# Patient Record
Sex: MALE | Race: WHITE | NOT HISPANIC OR LATINO
[De-identification: names, ages, dates, MRNs, and addresses within clinical notes are randomized per-mention and may not be internally consistent; named-entity substitution may affect disease eponyms.]

---

## 2018-04-17 ENCOUNTER — RX ONLY (RX ONLY)
Age: 73
End: 2018-04-17

## 2022-10-26 ENCOUNTER — RX ONLY (RX ONLY)
Age: 77
End: 2022-10-26

## 2022-11-22 ENCOUNTER — APPOINTMENT (OUTPATIENT)
Dept: URBAN - METROPOLITAN AREA CLINIC 194 | Age: 77
Setting detail: DERMATOLOGY
End: 2022-11-23

## 2022-11-22 VITALS
RESPIRATION RATE: 16 BRPM | SYSTOLIC BLOOD PRESSURE: 122 MMHG | WEIGHT: 190 LBS | DIASTOLIC BLOOD PRESSURE: 68 MMHG | TEMPERATURE: 98.4 F | HEART RATE: 76 BPM | HEIGHT: 71 IN

## 2022-11-22 VITALS
TEMPERATURE: 98.4 F | HEART RATE: 74 BPM | DIASTOLIC BLOOD PRESSURE: 68 MMHG | RESPIRATION RATE: 18 BRPM | SYSTOLIC BLOOD PRESSURE: 124 MMHG

## 2022-11-22 DIAGNOSIS — Z12.83 ENCOUNTER FOR SCREENING FOR MALIGNANT NEOPLASM OF SKIN: ICD-10-CM

## 2022-11-22 DIAGNOSIS — L57.8 OTHER SKIN CHANGES DUE TO CHRONIC EXPOSURE TO NONIONIZING RADIATION: ICD-10-CM

## 2022-11-22 PROBLEM — C44.311 BASAL CELL CARCINOMA OF SKIN OF NOSE: Status: ACTIVE | Noted: 2022-11-22

## 2022-11-22 PROBLEM — C44.619 BASAL CELL CARCINOMA OF SKIN OF LEFT UPPER LIMB, INCLUDING SHOULDER: Status: ACTIVE | Noted: 2022-11-22

## 2022-11-22 PROCEDURE — OTHER MOHS SURGERY: OTHER

## 2022-11-22 PROCEDURE — OTHER ASC CONSULTATION: OTHER

## 2022-11-22 PROCEDURE — OTHER COUNSELING: OTHER

## 2022-11-22 PROCEDURE — OTHER MIPS QUALITY: OTHER

## 2022-11-22 PROCEDURE — 17313 MOHS 1 STAGE T/A/L: CPT

## 2022-11-22 PROCEDURE — 13121 CMPLX RPR S/A/L 2.6-7.5 CM: CPT

## 2022-11-22 PROCEDURE — OTHER CONSULTATION FOR MOHS SURGERY: OTHER

## 2022-11-22 PROCEDURE — 99204 OFFICE O/P NEW MOD 45 MIN: CPT | Mod: 57

## 2022-11-22 PROCEDURE — OTHER SURGICAL DECISION MAKING: OTHER

## 2022-11-22 NOTE — PROCEDURE: MOHS SURGERY
Body Location Override (Optional - Billing Will Still Be Based On Selected Body Map Location If Applicable): left mid dorsal forearm

## 2022-11-22 NOTE — HPI: SKIN LESION (BASAL CELL CARCINOMA)
How Severe Is Your Skin Cancer?: mild
Is This A New Presentation, Or A Follow-Up?: New Basal Cell Carcinoma
When Was Basal Cell Biopsied? (Optional): 10/12/22

## 2022-11-22 NOTE — PROCEDURE: SURGICAL DECISION MAKING
Risk Assessment Explanation (Does Not Render In The Note): Clinical determination of the probability and/or consequences of an event, such as surgery. Clinical assessment of the level of risk is affected by the nature of the event under consideration for the patient. Modifier 57 is used to indicate an Evaluation and Management (E/M) service resulted in the initial decision to perform surgery either the day before a major surgery (90 day global) or the day of a major surgery.
Complexity (Necessary For Coding; Major - 90 Day Global With Some Exceptions; Minor - 10 Day Global): major
Initial Decision For Surgery?: Yes
Discussion: Decision for surgery refers to any surgeries performed today, or discussion of treatment in the future. In general, decision for repair is not made until the final extent of the surgical wound is known.
Date Of Surgery - Today Or Tomorrow?: today

## 2022-11-28 ENCOUNTER — APPOINTMENT (OUTPATIENT)
Dept: URBAN - METROPOLITAN AREA CLINIC 194 | Age: 77
Setting detail: DERMATOLOGY
End: 2022-11-29

## 2022-11-28 VITALS — RESPIRATION RATE: 16 BRPM | DIASTOLIC BLOOD PRESSURE: 76 MMHG | HEART RATE: 64 BPM | SYSTOLIC BLOOD PRESSURE: 146 MMHG

## 2022-11-28 VITALS
HEIGHT: 71.5 IN | HEART RATE: 60 BPM | WEIGHT: 190 LBS | DIASTOLIC BLOOD PRESSURE: 74 MMHG | SYSTOLIC BLOOD PRESSURE: 148 MMHG | RESPIRATION RATE: 16 BRPM | TEMPERATURE: 98.1 F

## 2022-11-28 DIAGNOSIS — Z12.83 ENCOUNTER FOR SCREENING FOR MALIGNANT NEOPLASM OF SKIN: ICD-10-CM

## 2022-11-28 PROBLEM — C44.311 BASAL CELL CARCINOMA OF SKIN OF NOSE: Status: ACTIVE | Noted: 2022-11-28

## 2022-11-28 PROBLEM — Z48.01 ENCOUNTER FOR CHANGE OR REMOVAL OF SURGICAL WOUND DRESSING: Status: ACTIVE | Noted: 2022-11-28

## 2022-11-28 PROCEDURE — 17311 MOHS 1 STAGE H/N/HF/G: CPT | Mod: 79

## 2022-11-28 PROCEDURE — OTHER DRESSING CHANGE: OTHER

## 2022-11-28 PROCEDURE — OTHER ASC CONSULTATION: OTHER

## 2022-11-28 PROCEDURE — 99212 OFFICE O/P EST SF 10 MIN: CPT | Mod: 25,24

## 2022-11-28 PROCEDURE — OTHER COUNSELING: OTHER

## 2022-11-28 PROCEDURE — OTHER MIPS QUALITY: OTHER

## 2022-11-28 PROCEDURE — 14061 TIS TRNFR E/N/E/L10.1-30SQCM: CPT | Mod: 79

## 2022-11-28 PROCEDURE — OTHER SURGICAL DECISION MAKING: OTHER

## 2022-11-28 PROCEDURE — OTHER MOHS SURGERY: OTHER

## 2022-11-28 NOTE — PROCEDURE: MOHS SURGERY
Body Location Override (Optional - Billing Will Still Be Based On Selected Body Map Location If Applicable): right lower nasal dorsum

## 2022-11-28 NOTE — PROCEDURE: DRESSING CHANGE
Add 54097 Cpt? (Important Note: In 2017 The Use Of 86350 Is Being Tracked By Cms To Determine Future Global Period Reimbursement For Global Periods): no
Detail Level: Detailed

## 2022-11-28 NOTE — PROCEDURE: MOHS SURGERY
136 Peng Advancement Flap Text: Because of the full-thickness nature of the defect and location adjacent to free margin of alar rims, and to maintain functional airway and alar rim position, a bilateral advancement flap was planned. After prep and anesthesia, incisions were extended from the opposite side of the wound along the alar grooves, and Burow’s triangles at the end of each incision were excised.  Two flaps were created by undermining in the subcutaneous plane skeletonizing the nasal cartilages. After hemostasis was obtained, the flaps were advanced and sutured in a layered fashion.

## 2022-11-28 NOTE — HPI: SKIN LESION (BASAL CELL CARCINOMA)
How Severe Is Your Skin Cancer?: moderate
Is This A New Presentation, Or A Follow-Up?: New Basal Cell Carcinoma
Location From Outside Provider (Will Override Previously Chosen Location): Midline lower nose
When Was Basal Cell Biopsied? (Optional): 10/12/22
Accession # (Optional): A

## 2022-11-28 NOTE — PROCEDURE: SURGICAL DECISION MAKING
Risk Assessment Explanation (Does Not Render In The Note): Clinical determination of the probability and/or consequences of an event, such as surgery. Clinical assessment of the level of risk is affected by the nature of the event under consideration for the patient. Modifier 57 is used to indicate an Evaluation and Management (E/M) service resulted in the initial decision to perform surgery either the day before a major surgery (90 day global) or the day of a major surgery.
Initial Decision For Surgery?: Yes
Discussion: Decision for surgery refers to any surgeries performed today, or discussion of treatment in the future. In general, decision for repair is not made until the final extent of the surgical wound is known.
Complexity (Necessary For Coding; Major - 90 Day Global With Some Exceptions; Minor - 10 Day Global): minor
Date Of Surgery - Today Or Tomorrow?: today

## 2022-11-28 NOTE — PROCEDURE: MOHS SURGERY
Patient discharged to home in stable condition. Written and verbal after care 
instructions given. Patient verbalizes understanding of instruction. Bilobed Flap Text: Because of the size and full-thickness nature of the defect, and to maintain form and function of the nose, and to avoid distortion of the nearby nasal tip and ala, a bilobed transposition flap was planned. After prep and local anesthesia, the bilobe was carefully incised with a Burow's triangle oriented superiorly. The flap was raised and the wound edges were undermined in the submuscular plane to the nasofacial junction. After hemostasis, the flaps were transposed into the defect and sutured in a layered fashion

## 2024-10-07 NOTE — PROCEDURE: MOHS SURGERY
09:00 Abbe Flap (Lower To Upper Lip) Text: The defect of the upper lip was assessed and measured.  Given the location and size of the defect, an Abbe flap was deemed most appropriate.  Using a sterile surgical marker, an appropriate Abbe flap was measured and drawn on the lower lip. Local anesthesia was then infiltrated. A scalpel was then used to incise the upper lip through and through the skin, vermilion, muscle and mucosa, leaving the flap pedicled on the opposite side.  The flap was then rotated and transferred to the lower lip defect.  The flap was then sutured into place with a three layer technique, closing the orbicularis oris muscle layer with subcutaneous buried sutures, followed by a mucosal layer and an epidermal layer.